# Patient Record
Sex: FEMALE | Race: WHITE | NOT HISPANIC OR LATINO | URBAN - METROPOLITAN AREA
[De-identification: names, ages, dates, MRNs, and addresses within clinical notes are randomized per-mention and may not be internally consistent; named-entity substitution may affect disease eponyms.]

---

## 2018-07-13 ENCOUNTER — OFFICE VISIT (OUTPATIENT)
Dept: URBAN - METROPOLITAN AREA CLINIC 76 | Facility: CLINIC | Age: 38
End: 2018-07-13
Payer: COMMERCIAL

## 2018-07-13 DIAGNOSIS — H52.13 MYOPIA, BILATERAL: Primary | ICD-10-CM

## 2018-07-13 PROCEDURE — 92015 DETERMINE REFRACTIVE STATE: CPT | Performed by: OPTOMETRIST

## 2018-07-13 PROCEDURE — 92012 INTRM OPH EXAM EST PATIENT: CPT | Performed by: OPTOMETRIST

## 2018-07-13 ASSESSMENT — KERATOMETRY
OS: 42.88
OD: 42.75

## 2018-07-13 ASSESSMENT — VISUAL ACUITY
OS: 20/20
OD: 20/20

## 2018-07-13 ASSESSMENT — INTRAOCULAR PRESSURE
OS: 18
OD: 16

## 2018-07-13 NOTE — IMPRESSION/PLAN
Impression: Myopia, bilateral: H52.13. OU. Plan: Discussed diagnosis with patient. Dispensed new MRx today. Order trial ctl rx pt ok to finalize.

## 2020-08-11 ENCOUNTER — OFFICE VISIT (OUTPATIENT)
Dept: URBAN - METROPOLITAN AREA CLINIC 76 | Facility: CLINIC | Age: 40
End: 2020-08-11
Payer: COMMERCIAL

## 2020-08-11 DIAGNOSIS — H04.123 DRY EYE SYNDROME OF BILATERAL LACRIMAL GLANDS: ICD-10-CM

## 2020-08-11 PROCEDURE — 92012 INTRM OPH EXAM EST PATIENT: CPT | Performed by: OPTOMETRIST

## 2020-08-11 PROCEDURE — 92310 CONTACT LENS FITTING OU: CPT | Performed by: OPTOMETRIST

## 2020-08-11 ASSESSMENT — KERATOMETRY
OD: 43.13
OS: 43.00

## 2020-08-11 ASSESSMENT — VISUAL ACUITY
OD: 20/20
OS: 20/20

## 2020-08-11 ASSESSMENT — INTRAOCULAR PRESSURE
OS: 16
OD: 15

## 2020-08-11 NOTE — IMPRESSION/PLAN
Impression: Dry eye syndrome of bilateral lacrimal glands: H04.123. Plan: Discussed diagnosis in detail with patient. Warm compresses with lid massage from top / down, bottom / up, and sweep from inside / out x 2. Patient instructed to use emulsive base lubricant 3-4 x a day. Increase omega foods/nuts and/or Borage oil supplement 1500-2500mg capsule orally per day. Can consider Restasis in the future.

## 2022-06-13 ENCOUNTER — OFFICE VISIT (OUTPATIENT)
Dept: URBAN - METROPOLITAN AREA CLINIC 76 | Facility: CLINIC | Age: 42
End: 2022-06-13
Payer: COMMERCIAL

## 2022-06-13 DIAGNOSIS — H52.13 MYOPIA, BILATERAL: Primary | ICD-10-CM

## 2022-06-13 DIAGNOSIS — H04.123 DRY EYE SYNDROME OF BILATERAL LACRIMAL GLANDS: ICD-10-CM

## 2022-06-13 PROCEDURE — 92012 INTRM OPH EXAM EST PATIENT: CPT | Performed by: OPTOMETRIST

## 2022-06-13 ASSESSMENT — VISUAL ACUITY
OS: 20/20
OD: 20/20

## 2022-06-13 ASSESSMENT — INTRAOCULAR PRESSURE
OS: 15
OD: 15

## 2022-06-13 ASSESSMENT — KERATOMETRY
OS: 42.88
OD: 42.88

## 2022-06-13 NOTE — IMPRESSION/PLAN
Impression: Myopia, bilateral: H52.13 Bilateral. Plan: Discussed condition. New mrx given today. Pt to call with any concerns. Pt deferred ctls exam today.

## 2023-07-19 ENCOUNTER — OFFICE VISIT (OUTPATIENT)
Dept: URBAN - METROPOLITAN AREA CLINIC 76 | Facility: CLINIC | Age: 43
End: 2023-07-19
Payer: COMMERCIAL

## 2023-07-19 DIAGNOSIS — H52.13 MYOPIA, BILATERAL: Primary | ICD-10-CM

## 2023-07-19 DIAGNOSIS — H04.123 DRY EYE SYNDROME OF BILATERAL LACRIMAL GLANDS: ICD-10-CM

## 2023-07-19 PROCEDURE — 92014 COMPRE OPH EXAM EST PT 1/>: CPT | Performed by: OPTOMETRIST

## 2023-07-19 RX ORDER — DOXYCYCLINE HYCLATE 100 MG/1
100 MG TABLET, COATED ORAL
Qty: 60 | Refills: 1 | Status: ACTIVE
Start: 2023-07-19

## 2023-07-19 ASSESSMENT — KERATOMETRY
OS: 43.00
OD: 42.88

## 2023-07-19 ASSESSMENT — INTRAOCULAR PRESSURE
OD: 15
OS: 15

## 2023-07-19 ASSESSMENT — VISUAL ACUITY
OS: 20/20
OD: 20/20

## 2023-07-19 NOTE — IMPRESSION/PLAN
Impression: Dry eye syndrome of bilateral lacrimal glands: H04.123 Bilateral. Pt unable to wear ctls due to dry eyes. Plan: Discussed diagnosis in detail with patient. Warm compresses with lid massage from top / down, bottom / up, and sweep from inside / out x 2. Patient instructed to use emulsive base lubricant 3-4 x a day. Increase omega foods/nuts and/or Borage/Fish/Krill/Primrose oil supplement 1500-2500mg capsule orally per day. Start Doxycycline 100 mg tab QD PO. Patient to call with any concerns.

## 2023-07-19 NOTE — IMPRESSION/PLAN
Impression: Myopia, bilateral: H52.13. Bilateral. Plan: Discussed condition. New mrx given today. Pt would like to hold off on an updated ctl rx until HA symptoms improve. Pt to call with any concerns.

## 2023-08-18 ENCOUNTER — OFFICE VISIT (OUTPATIENT)
Dept: URBAN - METROPOLITAN AREA CLINIC 76 | Facility: CLINIC | Age: 43
End: 2023-08-18

## 2023-08-18 DIAGNOSIS — H04.123 DRY EYE SYNDROME OF BILATERAL LACRIMAL GLANDS: ICD-10-CM

## 2023-08-18 DIAGNOSIS — H52.13 MYOPIA, BILATERAL: Primary | ICD-10-CM

## 2023-08-18 PROCEDURE — 92015 DETERMINE REFRACTIVE STATE: CPT | Performed by: OPTOMETRIST

## 2023-08-18 ASSESSMENT — VISUAL ACUITY
OS: 20/20
OD: 20/20

## 2023-08-18 ASSESSMENT — KERATOMETRY
OS: 42.88
OD: 43.00

## 2024-11-18 ENCOUNTER — OFFICE VISIT (OUTPATIENT)
Dept: URBAN - METROPOLITAN AREA CLINIC 76 | Facility: CLINIC | Age: 44
End: 2024-11-18
Payer: COMMERCIAL

## 2024-11-18 DIAGNOSIS — H04.123 DRY EYE SYNDROME OF BILATERAL LACRIMAL GLANDS: ICD-10-CM

## 2024-11-18 DIAGNOSIS — H52.13 MYOPIA, BILATERAL: Primary | ICD-10-CM

## 2024-11-18 DIAGNOSIS — H10.45 OTHER CHRONIC ALLERGIC CONJUNCTIVITIS: ICD-10-CM

## 2024-11-18 PROCEDURE — 92310 CONTACT LENS FITTING OU: CPT | Performed by: OPTOMETRIST

## 2024-11-18 PROCEDURE — 92014 COMPRE OPH EXAM EST PT 1/>: CPT | Performed by: OPTOMETRIST

## 2024-11-18 ASSESSMENT — INTRAOCULAR PRESSURE
OS: 14
OD: 14

## 2024-11-18 ASSESSMENT — KERATOMETRY
OS: 42.88
OD: 42.88

## 2024-11-18 ASSESSMENT — VISUAL ACUITY
OS: 20/20
OD: 20/20

## 2025-01-20 ENCOUNTER — OFFICE VISIT (OUTPATIENT)
Dept: URBAN - METROPOLITAN AREA CLINIC 76 | Facility: CLINIC | Age: 45
End: 2025-01-20

## 2025-01-20 DIAGNOSIS — H52.13 MYOPIA, BILATERAL: Primary | ICD-10-CM

## 2025-01-20 PROCEDURE — 92015 DETERMINE REFRACTIVE STATE: CPT | Performed by: OPTOMETRIST

## 2025-02-17 ENCOUNTER — OFFICE VISIT (OUTPATIENT)
Dept: URBAN - METROPOLITAN AREA CLINIC 76 | Facility: CLINIC | Age: 45
End: 2025-02-17

## 2025-02-17 DIAGNOSIS — H52.13 MYOPIA, BILATERAL: Primary | ICD-10-CM

## 2025-02-17 PROCEDURE — 92015 DETERMINE REFRACTIVE STATE: CPT | Performed by: OPTOMETRIST

## 2025-02-17 ASSESSMENT — VISUAL ACUITY
OD: 20/20
OS: 20/20

## 2025-02-17 ASSESSMENT — KERATOMETRY
OS: 42.63
OD: 43.00